# Patient Record
Sex: MALE | Race: WHITE
[De-identification: names, ages, dates, MRNs, and addresses within clinical notes are randomized per-mention and may not be internally consistent; named-entity substitution may affect disease eponyms.]

---

## 2019-04-06 ENCOUNTER — HOSPITAL ENCOUNTER (EMERGENCY)
Dept: HOSPITAL 80 - FED | Age: 34
LOS: 6 days | Discharge: HOME | End: 2019-04-12
Payer: COMMERCIAL

## 2019-04-06 VITALS — SYSTOLIC BLOOD PRESSURE: 116 MMHG | DIASTOLIC BLOOD PRESSURE: 68 MMHG

## 2019-04-06 DIAGNOSIS — S01.81XA: Primary | ICD-10-CM
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
## 2019-04-06 DIAGNOSIS — S60.312A: ICD-10-CM

## 2019-04-06 DIAGNOSIS — S70.311A: ICD-10-CM

## 2019-04-06 DIAGNOSIS — S80.211A: ICD-10-CM
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
## 2019-04-06 PROCEDURE — 0HQ1XZZ REPAIR FACE SKIN, EXTERNAL APPROACH: ICD-10-PCS

## 2019-04-06 NOTE — EDPHY
General


Time Seen by Provider: 04/06/19 13:48


Narrative: 





CLINICAL IMPRESSION: 


Chin laceration, abrasion of multiple sites


________________________


ASSESSMENT/PLAN: 


[ []with a history of [] who presents for evaluation of []. Patient is not 

toxic appearing, [] is in no distress. Physical examination reveals []. There 

is no evidence of deep structure involvement, neurovascular compromise, foreign 

body, or bony involvement. The wound was not contaminated, tetanus status was []

. The wound was irrigated and then repaired as discussed in the procedure note, 

the patient tolerated this well. Wound care instructions discussed with patient 

and family member. [] is well established with his PCP, they will call to 

schedule an appointment for wound check. Return precautions discussed- [] will 

return for increased pain, signs of infection, fever, vomiting, if the wound 

opens or for any other concerns. Patient and family member both verbalize 

understanding and are in agreement with plan] 


___________________________


DIFFERENTIAL DIAGNOSIS: 


[ includes but not limited to laceration of tendon or vascular structure, 

underlying fracture, laceration with retained FB] 


__________________


ED PROCEDURES: 





Laceration Repair 


Verbal consent obtained by patient. Risks discussed, including but not limited 

to infection, pain, retained foreign body, need for additional repair, poor 

cosmetic result, tendon damage, nerve damage, poor wound healing, vascular 

damage. Alternatives to repair discussed. 





Universal protocol used to establish correct patient, procedure, equipment, 

support staff, and site. Anesthesia obtained by local infiltration. 

Anesthetized with lidocaine with epinephrine. Laceration location right chin, 

length 3 cm, depth 8 mm, Repair type intermediate. 





Patient was prepped and draped in usual sterile fashion. Hemostasis achieved 

with direct pressure. Wound explored through full range of motion and entire 

depth of wound probed and visualized with gloved finger. No suspicion for nerve 

damage, tendon damage, underlying fracture, vascular damage, foreign body, or 

contamination. Area was cleansed with Shur-Clens and irrigated with sterile 

saline as per protocol. No foreign body or material removed.


 


Repair method the deep layer was closed with 5 0 PDS, 3 simple interrupted 

sutures.  The superficial layer was closed with 5.0 Prolene interrupted, a 

total of 11 sutures placed. Well aligned, closely approximated. wound was 

dressed with [ ]. Patient tolerated well with no immediate complications. 


Wound care: [Clean and dry x 24 hours, gently clean with soap and water, cover 

with topical antibiotic ointment/bandage.] 


Suture/Staple removal: [ ] Days 


____________________


CHIEF COMPLAINT: [Chin Laceration ] 


____________________


HPI: 


[ ] 


_________________________


PAST MEDICAL HISTORY:  Denies 


Pertinent Past Surgical History:  Denies 


Social History:  Denies 


_____________________


REVIEW OF SYSTEMS: 


All other systems negative 


Constitutional: [No fever, no chills] 


Musculoskeletal:  Left palm pain. [No deformity.] 


Skin:  Chin laceration, multiple abrasions


Neurological: [No sensory loss or weakness.] 


_________________


PHYSICAL EXAM: 


General Appearance: [Alert, oriented, appropriate for age, cooperative, NAD, 

well hydrated, non-toxic appearing, VSS, no hypoxia.] 


Neurological: [ Alert and oriented x 3] 


Skin: [ ] 


Musculoskeletal: [ ] 





3 cm macerated, irregular combination of abrasion laceration to the right chin.





Abrasion left.  Thumb metacarpal.  Left palmar tenderness to palpation.  No 

anatomical snuffbox tenderness





Right thigh with 2 cm abrasion, right knee and lateral calf with very 

superficial faint road rash.


_____________________________


MEDICAL DECISION MAKING: 


Patient was seen independently. Secondary supervising physician at time of 

evaluation was Dr. Crawley, he did not evaluate this patient. 


Diagnosis:  Chin laceration, abrasion of multiple sites. 


Summary: [See assessment and plan for summary of ED visit ] 


Clinical lab tests:  Not applicable. 


Independent visualization of images, tracing, or specimens: not applicable. 


Decision to obtain medical records or history from someone other than the 

patient:  No 


Review / Summarize previous medical records:  No 


Discussed patient with another provider:  Yes, Dr. Crawley





- History


Smoking Status: Never smoked





- Objective


Vital Signs: 


 Initial Vital Signs











Temperature (C)  36.9 C   04/06/19 13:29


 


Heart Rate  54 L  04/06/19 13:29


 


Respiratory Rate  18   04/06/19 13:29


 


Blood Pressure  116/68   04/06/19 13:29


 


O2 Sat (%)  96   04/06/19 13:29








 











O2 Delivery Mode               Room Air














Allergies/Adverse Reactions: 


 





Sulfa (Sulfonamide Antibiotics) Allergy (Verified 04/06/19 13:31)


 








Home Medications: 














 Medication  Instructions  Recorded


 


NK [No Known Home Meds]  04/06/19














Departure





- Departure


Disposition: Home, Routine, Self-Care


Clinical Impression: 


 Laceration of chin, Abrasion, multiple sites





Condition: Good


Instructions:  Laceration (ED)


Additional Instructions: 


DISCHARGE INSTRUCTIONS FROM YOUR DOCTOR 


Thank you for visiting our emergency department today. Please keep in mind that 

discharge from the emergency department does not mean that there is nothing 

wrong - it simply means that we have not identified an emergency condition that 

requires further evaluation or treatment in the hospital. You should always 

plan to follow up with primary care for re-evaluation of your condition in the 

next 2-3 days. 





Keep wound clean and dry for 24 hours. Then remove dressing, clean at least 

twice daily or when soiled with soap and water, apply antibiotic ointment and 

dressing.





Do not soak the wound while the stitches are in place. 





Anticipate suture removal in 5 days.





For pain control:


You may take Tylenol, I recommend 500-1000 mg every 6-8 hours as needed.  Take 

with food and a full glass of water.  Stop taking if this is upsetting her 

stomach.  Do not exceed 4000 mg in a 24 hr period.





You may also take ibuprofen, recommend 400 mg every 6 hr.  Take with food and a 

full glass of water.  Stop taking if this upsets her stomach.  Do not exceed 

2400 mg in a 24 hr period.





Schedule a follow-up visit with your primary care physician or the emergency 

department for suture removal in 5 days and sooner for wound check for any 

concerns. 





Return for signs of wound infection ie: redness, swelling, drainage, foul odor, 

red streaks, fever, chills, pain, bleeding, if the stitches pop, if the wound 

opens or for any other new, worsening or worrisome symptoms.





People present with illnesses and injuries in different ways, and it is always 

possible that we have missed something. You may always return for re-evaluation 

if symptoms worsen or if they are not improving or if you develop new/different 

symptoms. 





Again, thank you for choosing our emergency department. We hope that you feel 

better.


Referrals: 


KIN BUCKLEY [Primary Care Provider] - As per Instructions (Five days for 

suture removal)


ED,PHYSIC[facility] ALL [Medical Doctor] - As per Instructions (Five days for 

suture removal)